# Patient Record
Sex: FEMALE | Race: WHITE
[De-identification: names, ages, dates, MRNs, and addresses within clinical notes are randomized per-mention and may not be internally consistent; named-entity substitution may affect disease eponyms.]

---

## 2018-01-24 ENCOUNTER — HOSPITAL ENCOUNTER (INPATIENT)
Dept: HOSPITAL 54 - ER | Age: 73
LOS: 3 days | Discharge: NURSING, REHAB OR LONG-TERM CARE | DRG: 391 | End: 2018-01-27
Attending: INTERNAL MEDICINE | Admitting: INTERNAL MEDICINE
Payer: MEDICARE

## 2018-01-24 VITALS — WEIGHT: 184 LBS | BODY MASS INDEX: 28.88 KG/M2 | HEIGHT: 67 IN

## 2018-01-24 VITALS — DIASTOLIC BLOOD PRESSURE: 70 MMHG | SYSTOLIC BLOOD PRESSURE: 124 MMHG

## 2018-01-24 DIAGNOSIS — I10: ICD-10-CM

## 2018-01-24 DIAGNOSIS — R53.1: ICD-10-CM

## 2018-01-24 DIAGNOSIS — K58.0: Primary | ICD-10-CM

## 2018-01-24 DIAGNOSIS — K21.9: ICD-10-CM

## 2018-01-24 DIAGNOSIS — J44.9: ICD-10-CM

## 2018-01-24 DIAGNOSIS — F03.90: ICD-10-CM

## 2018-01-24 DIAGNOSIS — I25.10: ICD-10-CM

## 2018-01-24 DIAGNOSIS — R79.89: ICD-10-CM

## 2018-01-24 DIAGNOSIS — G92: ICD-10-CM

## 2018-01-24 DIAGNOSIS — M19.90: ICD-10-CM

## 2018-01-24 DIAGNOSIS — E86.0: ICD-10-CM

## 2018-01-24 LAB
BASOPHILS # BLD AUTO: 0 /CMM (ref 0–0.2)
BASOPHILS NFR BLD AUTO: 0.6 % (ref 0–2)
BUN SERPL-MCNC: 30 MG/DL (ref 7–18)
CALCIUM SERPL-MCNC: 9.2 MG/DL (ref 8.5–10.1)
CHLORIDE SERPL-SCNC: 104 MMOL/L (ref 98–107)
CO2 SERPL-SCNC: 26 MMOL/L (ref 21–32)
CREAT SERPL-MCNC: 1.1 MG/DL (ref 0.6–1.3)
EOSINOPHIL # BLD AUTO: 0.1 /CMM (ref 0–0.7)
EOSINOPHIL NFR BLD AUTO: 1.9 % (ref 0–6)
GLUCOSE SERPL-MCNC: 104 MG/DL (ref 74–106)
HCT VFR BLD AUTO: 41 % (ref 33–45)
HGB BLD-MCNC: 13.5 G/DL (ref 11.5–14.8)
LYMPHOCYTES NFR BLD AUTO: 1.5 /CMM (ref 0.8–4.8)
LYMPHOCYTES NFR BLD AUTO: 19.2 % (ref 20–44)
MCH RBC QN AUTO: 28 PG (ref 26–33)
MCHC RBC AUTO-ENTMCNC: 33 G/DL (ref 31–36)
MCV RBC AUTO: 83 FL (ref 82–100)
MONOCYTES NFR BLD AUTO: 0.5 /CMM (ref 0.1–1.3)
MONOCYTES NFR BLD AUTO: 6.4 % (ref 2–12)
NEUTROPHILS # BLD AUTO: 5.7 /CMM (ref 1.8–8.9)
NEUTROPHILS NFR BLD AUTO: 71.9 % (ref 43–81)
PLATELET # BLD AUTO: 253 /CMM (ref 150–450)
POTASSIUM SERPL-SCNC: 4.2 MMOL/L (ref 3.5–5.1)
RBC # BLD AUTO: 4.9 MIL/UL (ref 4–5.2)
RDW COEFFICIENT OF VARIATION: 13.9 (ref 11.5–15)
SODIUM SERPL-SCNC: 137 MMOL/L (ref 136–145)
WBC NRBC COR # BLD AUTO: 7.8 K/UL (ref 4.3–11)

## 2018-01-24 PROCEDURE — Z7610: HCPCS

## 2018-01-24 PROCEDURE — A4606 OXYGEN PROBE USED W OXIMETER: HCPCS

## 2018-01-24 RX ADMIN — SODIUM CHLORIDE PRN MLS/HR: 9 INJECTION, SOLUTION INTRAVENOUS at 20:04

## 2018-01-24 RX ADMIN — ENOXAPARIN SODIUM SCH MG: 30 INJECTION SUBCUTANEOUS at 20:04

## 2018-01-24 NOTE — NUR
MS RN INITIAL NOTES 



PT IS IN BED RESTING, AWAKE AND ALERT. ABLE TO MAKE NEEDS KNOWN. PT IS IN GOOD SPIRITS. 
DENIES PAIN. BREATHING EVENLY AND UNLABORED ON RA, NO SIGNS OF SOB OR DISTRESS. SKIN IS 
INTACT, C- SECTION SCARRING NOTED. IV FLUIDS TO BE HUNG, IV ACCESS IS INTACT AND PATENT. BED 
IS IN LOW AND LOCKED POSITION, CALL LIGHT WITHIN REACH. WILL CONTINUE TO MONITOR PT

## 2018-01-24 NOTE — NUR
AAOX3, FROM SNF C/O L SIDE ABDOMINAL PAIN. RR IS EVEN AND UNLABORED WITH NAD 
NOTED. SKIN IS WARM AND DRY. AWAITING MD FOR EVAL.

## 2018-01-25 VITALS — DIASTOLIC BLOOD PRESSURE: 61 MMHG | SYSTOLIC BLOOD PRESSURE: 123 MMHG

## 2018-01-25 VITALS — SYSTOLIC BLOOD PRESSURE: 130 MMHG | DIASTOLIC BLOOD PRESSURE: 84 MMHG

## 2018-01-25 VITALS — DIASTOLIC BLOOD PRESSURE: 68 MMHG | SYSTOLIC BLOOD PRESSURE: 129 MMHG

## 2018-01-25 LAB
APPEARANCE UR: CLEAR
BASOPHILS # BLD AUTO: 0 /CMM (ref 0–0.2)
BASOPHILS NFR BLD AUTO: 0.5 % (ref 0–2)
BILIRUB UR QL STRIP: NEGATIVE
BUN SERPL-MCNC: 25 MG/DL (ref 7–18)
CALCIUM SERPL-MCNC: 8.8 MG/DL (ref 8.5–10.1)
CHLORIDE SERPL-SCNC: 108 MMOL/L (ref 98–107)
CO2 SERPL-SCNC: 26 MMOL/L (ref 21–32)
COLOR UR: YELLOW
CREAT SERPL-MCNC: 1 MG/DL (ref 0.6–1.3)
EOSINOPHIL # BLD AUTO: 0.2 /CMM (ref 0–0.7)
EOSINOPHIL NFR BLD AUTO: 3.4 % (ref 0–6)
GLUCOSE SERPL-MCNC: 87 MG/DL (ref 74–106)
GLUCOSE UR STRIP-MCNC: NEGATIVE MG/DL
HCT VFR BLD AUTO: 36 % (ref 33–45)
HGB BLD-MCNC: 12.1 G/DL (ref 11.5–14.8)
HGB UR QL STRIP: NEGATIVE ERY/UL
KETONES UR STRIP-MCNC: NEGATIVE MG/DL
LEUKOCYTE ESTERASE UR QL STRIP: NEGATIVE
LYMPHOCYTES NFR BLD AUTO: 1.9 /CMM (ref 0.8–4.8)
LYMPHOCYTES NFR BLD AUTO: 34.1 % (ref 20–44)
MAGNESIUM SERPL-MCNC: 1.7 MG/DL (ref 1.8–2.4)
MCH RBC QN AUTO: 28 PG (ref 26–33)
MCHC RBC AUTO-ENTMCNC: 33 G/DL (ref 31–36)
MCV RBC AUTO: 85 FL (ref 82–100)
MONOCYTES NFR BLD AUTO: 0.5 /CMM (ref 0.1–1.3)
MONOCYTES NFR BLD AUTO: 9 % (ref 2–12)
NEUTROPHILS # BLD AUTO: 2.9 /CMM (ref 1.8–8.9)
NEUTROPHILS NFR BLD AUTO: 53 % (ref 43–81)
NITRITE UR QL STRIP: NEGATIVE
PH UR STRIP: 5.5 [PH] (ref 5–8)
PHOSPHATE SERPL-MCNC: 3.9 MG/DL (ref 2.5–4.9)
PLATELET # BLD AUTO: 214 /CMM (ref 150–450)
POTASSIUM SERPL-SCNC: 4.5 MMOL/L (ref 3.5–5.1)
PROT UR QL STRIP: NEGATIVE MG/DL
RBC # BLD AUTO: 4.28 MIL/UL (ref 4–5.2)
RDW COEFFICIENT OF VARIATION: 14.7 (ref 11.5–15)
SODIUM SERPL-SCNC: 143 MMOL/L (ref 136–145)
UROBILINOGEN UR STRIP-MCNC: 0.2 EU/DL
WBC NRBC COR # BLD AUTO: 5.5 K/UL (ref 4.3–11)

## 2018-01-25 RX ADMIN — FAMOTIDINE SCH MG: 20 TABLET, FILM COATED ORAL at 09:23

## 2018-01-25 RX ADMIN — ACETAMINOPHEN PRN MG: 325 TABLET ORAL at 06:23

## 2018-01-25 RX ADMIN — Medication SCH G: at 09:24

## 2018-01-25 RX ADMIN — MAGNESIUM SULFATE IN DEXTROSE SCH MLS/HR: 10 INJECTION, SOLUTION INTRAVENOUS at 13:35

## 2018-01-25 RX ADMIN — SERTRALINE HYDROCHLORIDE SCH MG: 25 TABLET, FILM COATED ORAL at 09:25

## 2018-01-25 RX ADMIN — Medication SCH G: at 17:29

## 2018-01-25 RX ADMIN — FERROUS SULFATE TAB 325 MG (65 MG ELEMENTAL FE) SCH MG: 325 (65 FE) TAB at 09:24

## 2018-01-25 RX ADMIN — FAMOTIDINE SCH MG: 20 TABLET, FILM COATED ORAL at 17:29

## 2018-01-25 RX ADMIN — Medication SCH G: at 12:23

## 2018-01-25 RX ADMIN — LOSARTAN POTASSIUM SCH MG: 50 TABLET, FILM COATED ORAL at 09:24

## 2018-01-25 RX ADMIN — SODIUM CHLORIDE PRN MLS/HR: 9 INJECTION, SOLUTION INTRAVENOUS at 16:48

## 2018-01-25 RX ADMIN — PANTOPRAZOLE SODIUM SCH MG: 40 TABLET, DELAYED RELEASE ORAL at 08:15

## 2018-01-25 RX ADMIN — ALBUTEROL SULFATE SCH MG: 2.5 SOLUTION RESPIRATORY (INHALATION) at 22:05

## 2018-01-25 RX ADMIN — ENOXAPARIN SODIUM SCH MG: 30 INJECTION SUBCUTANEOUS at 21:00

## 2018-01-25 RX ADMIN — ALBUTEROL SULFATE SCH MG: 2.5 SOLUTION RESPIRATORY (INHALATION) at 22:18

## 2018-01-25 RX ADMIN — Medication SCH G: at 21:14

## 2018-01-25 RX ADMIN — TEMAZEPAM SCH MG: 15 CAPSULE ORAL at 21:15

## 2018-01-25 RX ADMIN — MAGNESIUM SULFATE IN DEXTROSE SCH MLS/HR: 10 INJECTION, SOLUTION INTRAVENOUS at 11:57

## 2018-01-25 NOTE — NUR
MS RN CLOSING NOTES 



PATIENT AWAKE IN BED WATCHING TV AT THIS TIME. HOB ELEVATED. A/O X4, SAME VERBALLY 
RESPONSIVE WITH NO C/O PAIN THROUGHOUT THE DAY. ON ROOM AIR, BREATHING EVEN AND UNLABORED, 
NO SOB NOTED.  IV FLUIDS OF NS @75ML/HR INFUSING WELL TO PERIPHERAL LINE ON RIGHT HAND G#22, 
NO SIGNS OF INFILTRATION NOTED. MAINTAINED BED IN LOW AND LOCKED POSITION, CALL LIGHT WITHIN 
REACH. ALL SAFETY MEASURES ENFORCED. ALL NEEDS AND CARE PROVIDED WELL. WILL ENDORSED TO 
NIGHT SHIFT NURSE FOR MARIAELENA.

## 2018-01-25 NOTE — NUR
MS RN OPENING NOTES 



RECEIVED PT AWAKE IN BED IN NO ACUTE SIGNS OF DISTRESS. A/O X4, SAME ABLE TO MAKE NEEDS 
KNOWN, DENIES PAIN OR DISCOMFORTS AT THIS TIME. ON ROOM AIR, BREATHING EVEN AND UNLABORED. 
IV FLUIDS OF NS @75ML/HR INFUSING TO PERIPHERAL LINE ON LEFT HAND, NO S/S OF INFILTRATION 
NOTED. BED IS IN LOW AND LOCKED POSITION, CALL LIGHT WITHIN REACH. WILL CONTINUE TO MONITOR 
PT ACCORDINGLY

## 2018-01-25 NOTE — NUR
RN NOTES



PATIENT'S IV ACCESS ON LEFT HAND INFILTRATED, NEW IV ACCESS ON RIGHT HAND G#22 INSERTED. IVF 
OF NS @75ML/HR RESUMED. WILL CONTINUE TO MONITOR.

## 2018-01-25 NOTE — NUR
MS RN CLOSING NOTES 



PT IS IN BED RESTING. NO SIGNS OF SOB OR DISTRESS. BREATHING EVENLY AND UNLABORED ON RA. IV 
ACCESS IS INTACT AND PATENT WITH FLUIDS RUNNING.URINE COLLECTED. NO ACUTE CHANGES THROUGHOUT 
THE SHIFT. ALL NEEDS WERE ANTICIPATED AND MET. BED IS IN LOW AND LOCKED POSITION, CALL LIGHT 
WITHIN REACH. WILL ENDORSE TO DAYSHIFT.

## 2018-01-25 NOTE — NUR
MS RN INITIAL NOTES 



PT IS IN BED AWAKE AND ALERT, ABLE TO MAKE NEEDS KNOWN. NO SIGNS OF SOB OR DISTRESS, 
BREATHING EVENLY AND UNLABORED ON RA. IV ACCESS IS INTACT AND PATENT. DENIES PAIN AT THIS 
TIME. BED IS IN LOW AND LOCKED POSITION, CALL LIGHT WITHIN REACH. WILL CONTINUE TO MONITOR 
PT.

## 2018-01-26 VITALS — SYSTOLIC BLOOD PRESSURE: 118 MMHG | DIASTOLIC BLOOD PRESSURE: 68 MMHG

## 2018-01-26 VITALS — SYSTOLIC BLOOD PRESSURE: 132 MMHG | DIASTOLIC BLOOD PRESSURE: 75 MMHG

## 2018-01-26 VITALS — DIASTOLIC BLOOD PRESSURE: 65 MMHG | SYSTOLIC BLOOD PRESSURE: 113 MMHG

## 2018-01-26 VITALS — SYSTOLIC BLOOD PRESSURE: 113 MMHG | DIASTOLIC BLOOD PRESSURE: 65 MMHG

## 2018-01-26 LAB
BUN SERPL-MCNC: 26 MG/DL (ref 7–18)
CALCIUM SERPL-MCNC: 8.6 MG/DL (ref 8.5–10.1)
CHLORIDE SERPL-SCNC: 105 MMOL/L (ref 98–107)
CO2 SERPL-SCNC: 28 MMOL/L (ref 21–32)
CREAT SERPL-MCNC: 1 MG/DL (ref 0.6–1.3)
GLUCOSE SERPL-MCNC: 139 MG/DL (ref 74–106)
MAGNESIUM SERPL-MCNC: 2.3 MG/DL (ref 1.8–2.4)
POTASSIUM SERPL-SCNC: 4.1 MMOL/L (ref 3.5–5.1)
SODIUM SERPL-SCNC: 140 MMOL/L (ref 136–145)

## 2018-01-26 RX ADMIN — ALBUTEROL SULFATE SCH MG: 2.5 SOLUTION RESPIRATORY (INHALATION) at 01:30

## 2018-01-26 RX ADMIN — SERTRALINE HYDROCHLORIDE SCH MG: 25 TABLET, FILM COATED ORAL at 08:26

## 2018-01-26 RX ADMIN — Medication SCH G: at 08:26

## 2018-01-26 RX ADMIN — Medication SCH G: at 21:53

## 2018-01-26 RX ADMIN — ACETAMINOPHEN PRN MG: 325 TABLET ORAL at 05:14

## 2018-01-26 RX ADMIN — ALBUTEROL SULFATE SCH MG: 2.5 SOLUTION RESPIRATORY (INHALATION) at 13:55

## 2018-01-26 RX ADMIN — LOSARTAN POTASSIUM SCH MG: 50 TABLET, FILM COATED ORAL at 08:26

## 2018-01-26 RX ADMIN — ACETAMINOPHEN PRN MG: 325 TABLET ORAL at 07:48

## 2018-01-26 RX ADMIN — Medication SCH G: at 12:46

## 2018-01-26 RX ADMIN — PANTOPRAZOLE SODIUM SCH MG: 40 TABLET, DELAYED RELEASE ORAL at 07:48

## 2018-01-26 RX ADMIN — FAMOTIDINE SCH MG: 20 TABLET, FILM COATED ORAL at 08:26

## 2018-01-26 RX ADMIN — ALBUTEROL SULFATE SCH MG: 2.5 SOLUTION RESPIRATORY (INHALATION) at 08:59

## 2018-01-26 RX ADMIN — FERROUS SULFATE TAB 325 MG (65 MG ELEMENTAL FE) SCH MG: 325 (65 FE) TAB at 08:26

## 2018-01-26 RX ADMIN — TEMAZEPAM SCH MG: 15 CAPSULE ORAL at 21:53

## 2018-01-26 RX ADMIN — ENOXAPARIN SODIUM SCH MG: 30 INJECTION SUBCUTANEOUS at 21:00

## 2018-01-26 RX ADMIN — FAMOTIDINE SCH MG: 20 TABLET, FILM COATED ORAL at 17:15

## 2018-01-26 RX ADMIN — ALBUTEROL SULFATE SCH MG: 2.5 SOLUTION RESPIRATORY (INHALATION) at 19:52

## 2018-01-26 RX ADMIN — Medication SCH G: at 17:15

## 2018-01-26 NOTE — NUR
MS RN OPENING NOTES 



PATIENT RECEIVED AWAKE IN BED IN NO ACUTE SIGNS OF DISTRESS. A/O X4. VERBALLY RESPONSIVE, 
DENIES PAIN OR DISCOMFORTS AT THIS TIME. ON ROOM AIR, BREATHING EVEN AND UNLABORED. IV 
ACCESS ON RIGHT HAND G#22 INTACT AND PATENT, IVF OF NS @75ML/HR INFUSING WELL, NO SIGNS OF 
INFILTRATION OR PHLEBITIS TO IV SITE NOTED. BED IS IN LOW AND LOCKED POSITION, CALL LIGHT 
WITHIN REACH. WILL CONTINUE TO MONITOR PT ACCORDINGLY

## 2018-01-26 NOTE — NUR
RN NOTES



PT KEEPS ON GOING ON AND OFF TO THE BATHROOM TO URINATE. REQUESTED TO REMOVE IV ACCESS. 
EXPLAINED THAT WE NEED IV ACCESS INTACT AND PATENT AS HOSPITAL PROTOCOL/POLICY. PT 
UNDERSTAND BUT REFUSED IVF AT THIS TIME. ENCOURAGED TO INCREASED P.O. FLUIDS THEN AND 
VERBALIZED UNDERSTANDING. PT DRINKING WELL. WILL CONTINUE TO MONITOR.

## 2018-01-26 NOTE — NUR
RN NOTES



PATIENT REFUSED X3, LOVENOX 30MG ADMINISTRATION. PATIENT EDUCATION REINFORCED REGARDING 
ANTICOAGULATION. WILL CONTINUE TO MONITOR.

## 2018-01-26 NOTE — NUR
MS RN CLOSING NOTES 



PT IS IN BED RESTING. NO SIGNS OF SOB OR DISTRESS. BREATHING EVENLY AND UNLABORED ON RA. IV 
ACCESS IS INTACT AND PATENT WITH FLUIDS RUNNING. NO ACUTE CHANGES THROUGHOUT THE SHIFT. ALL 
NEEDS WERE ANTICIPATED AND MET. BED IS IN LOW AND LOCKED POSITION, CALL LIGHT WITHIN REACH. 
WILL ENDORSE TO DAYSHIFT.

## 2018-01-26 NOTE — NUR
RN OPEN NOTES



RECEIVED PATIENT AWAKE IN BED. A/O X4. NO SIGNS OF DISTRESS OR DISCOMFORT. BREATHING EVEN 
AND UNLABORED. IV ACCESS IN R HAND PATENT AND INTACT, NO SIGNS OF REDNESS OR INFILTRATION. 
PATIENT REFUSING IVF AT THIS TIME. BED IN LOW LOCKED POSITION WITH SIDE RAILS X3. CALL LIGHT 
WITHIN REACH. WILL CONTINUE TO [id number].

## 2018-01-26 NOTE — NUR
RN NOTES



PATIENT C/O MILD HEADACHE AND REQUESTED TYLENOL. PRN TYLENOL 650MG TAB GIVEN. WILL CONTINUE 
TO MONITOR

## 2018-01-26 NOTE — NUR
MS RN CLOSING NOTES 



PATIENT AWAKE AND READING POCKET BOOKS IN BED. A/O X4, SAME VERBALLY RESPONSIVE AND 
AMBULATORY. ON ROOM AIR, BREATHING EVEN AND UNLABORED, NO SOB NOTED.  IV ACCESS ON RIGHT 
HAND G#22 INTACT AND PATENT, PATIENT REQUESTED NOT TO CONNECT IVF AT THIS TIME BEC SHE GOES 
TO BATHROOM SEVERAL TIMES. HOB ELEVATED. KEPT BED IN LOW AND LOCKED POSITION, CALL LIGHT 
WITHIN REACH. ALL SAFETY MEASURES MAINTAINED. ALL DUE MEDS GIVEN AS ORDERED AND TOLERATED. 
ALL NEEDS AND CARE PROVIDED WELL. WILL ENDORSED TO NIGHT SHIFT NURSE FOR MARIAELENA.

## 2018-01-27 VITALS — SYSTOLIC BLOOD PRESSURE: 114 MMHG | DIASTOLIC BLOOD PRESSURE: 60 MMHG

## 2018-01-27 VITALS — DIASTOLIC BLOOD PRESSURE: 63 MMHG | SYSTOLIC BLOOD PRESSURE: 120 MMHG

## 2018-01-27 RX ADMIN — FAMOTIDINE SCH MG: 20 TABLET, FILM COATED ORAL at 17:34

## 2018-01-27 RX ADMIN — Medication SCH G: at 17:34

## 2018-01-27 RX ADMIN — ACETAMINOPHEN PRN MG: 325 TABLET ORAL at 02:48

## 2018-01-27 RX ADMIN — LOSARTAN POTASSIUM SCH MG: 50 TABLET, FILM COATED ORAL at 10:46

## 2018-01-27 RX ADMIN — PANTOPRAZOLE SODIUM SCH MG: 40 TABLET, DELAYED RELEASE ORAL at 07:30

## 2018-01-27 RX ADMIN — ALBUTEROL SULFATE SCH MG: 2.5 SOLUTION RESPIRATORY (INHALATION) at 01:30

## 2018-01-27 RX ADMIN — ALBUTEROL SULFATE SCH MG: 2.5 SOLUTION RESPIRATORY (INHALATION) at 07:34

## 2018-01-27 RX ADMIN — ALBUTEROL SULFATE SCH MG: 2.5 SOLUTION RESPIRATORY (INHALATION) at 13:28

## 2018-01-27 RX ADMIN — Medication SCH G: at 10:47

## 2018-01-27 RX ADMIN — FAMOTIDINE SCH MG: 20 TABLET, FILM COATED ORAL at 10:47

## 2018-01-27 RX ADMIN — FERROUS SULFATE TAB 325 MG (65 MG ELEMENTAL FE) SCH MG: 325 (65 FE) TAB at 10:45

## 2018-01-27 RX ADMIN — SERTRALINE HYDROCHLORIDE SCH MG: 25 TABLET, FILM COATED ORAL at 10:47

## 2018-01-27 RX ADMIN — Medication SCH G: at 13:59

## 2018-01-27 NOTE — NUR
MS/RN REPORT GIVEN TO ACCEPTING FACILITY /KELSEY RN  @ [facility] [phone].  PT 
PERSONALLY DISCUSSED AND AGREED TO GO WITH  @ [phone] CINTHIA MENDEZ. EXIT 
CARE TEACHINGS PROVIDED, VSS NO ACUTE DISTRESS NOTED,H/L AND ID BAND REMOVED. AMBULANCE ON 
THE WAY TO  THE PT

## 2018-01-27 NOTE — NUR
RN CLOSING NOTES



PATIENT RESTING IN BED, EASILY AROUSABLE. A/O X4. NO SIGNS OF DISTRESS OR DISCOMFORT. 
BREATHING EVEN AND UNLABORED. IV ACCESS IN R HAND PATENT AND INTACT, NO SIGNS OF REDNESS OR 
INFILTRATION. PATIENT STILL REFUSING IVF AT THIS TIME. ALL NEEDS MET. NO SIGNIFICANT CHANGES 
THROUGH THE NIGHT. BED IN LOW LOCKED POSITION WITH SIDE RAILS X3. CALL LIGHT WITHIN REACH. 
WILL ENDORSE TO AM SHIFT FOR MARIAELENA.